# Patient Record
Sex: MALE | Race: BLACK OR AFRICAN AMERICAN | Employment: FULL TIME | URBAN - METROPOLITAN AREA
[De-identification: names, ages, dates, MRNs, and addresses within clinical notes are randomized per-mention and may not be internally consistent; named-entity substitution may affect disease eponyms.]

---

## 2024-11-20 ENCOUNTER — LAB ENCOUNTER (OUTPATIENT)
Dept: LAB | Age: 54
End: 2024-11-20
Attending: FAMILY MEDICINE
Payer: COMMERCIAL

## 2024-11-20 ENCOUNTER — OFFICE VISIT (OUTPATIENT)
Dept: FAMILY MEDICINE CLINIC | Facility: CLINIC | Age: 54
End: 2024-11-20
Payer: COMMERCIAL

## 2024-11-20 VITALS
RESPIRATION RATE: 20 BRPM | HEIGHT: 67 IN | BODY MASS INDEX: 42.53 KG/M2 | DIASTOLIC BLOOD PRESSURE: 90 MMHG | HEART RATE: 90 BPM | WEIGHT: 271 LBS | OXYGEN SATURATION: 95 % | SYSTOLIC BLOOD PRESSURE: 152 MMHG

## 2024-11-20 DIAGNOSIS — M25.562 CHRONIC PAIN OF BOTH KNEES: ICD-10-CM

## 2024-11-20 DIAGNOSIS — Z12.5 SCREENING FOR PROSTATE CANCER: ICD-10-CM

## 2024-11-20 DIAGNOSIS — Z83.3 FAMILY HISTORY OF DIABETES MELLITUS: ICD-10-CM

## 2024-11-20 DIAGNOSIS — D64.9 ANEMIA, UNSPECIFIED TYPE: ICD-10-CM

## 2024-11-20 DIAGNOSIS — I10 ESSENTIAL HYPERTENSION: ICD-10-CM

## 2024-11-20 DIAGNOSIS — M25.561 CHRONIC PAIN OF BOTH KNEES: ICD-10-CM

## 2024-11-20 DIAGNOSIS — G89.29 CHRONIC PAIN OF BOTH KNEES: ICD-10-CM

## 2024-11-20 DIAGNOSIS — Z00.00 LABORATORY EXAM ORDERED AS PART OF ROUTINE GENERAL MEDICAL EXAMINATION: ICD-10-CM

## 2024-11-20 DIAGNOSIS — Z12.11 COLON CANCER SCREENING: ICD-10-CM

## 2024-11-20 LAB
ALBUMIN SERPL-MCNC: 4.3 G/DL (ref 3.2–4.8)
ALBUMIN/GLOB SERPL: 1.3 {RATIO} (ref 1–2)
ALP LIVER SERPL-CCNC: 75 U/L
ALT SERPL-CCNC: 14 U/L
ANION GAP SERPL CALC-SCNC: 6 MMOL/L (ref 0–18)
AST SERPL-CCNC: 21 U/L (ref ?–34)
BASOPHILS # BLD AUTO: 0.04 X10(3) UL (ref 0–0.2)
BASOPHILS NFR BLD AUTO: 0.8 %
BILIRUB SERPL-MCNC: 0.5 MG/DL (ref 0.3–1.2)
BUN BLD-MCNC: 13 MG/DL (ref 9–23)
CALCIUM BLD-MCNC: 9.3 MG/DL (ref 8.7–10.4)
CHLORIDE SERPL-SCNC: 110 MMOL/L (ref 98–112)
CHOLEST SERPL-MCNC: 137 MG/DL (ref ?–200)
CO2 SERPL-SCNC: 27 MMOL/L (ref 21–32)
COMPLEXED PSA SERPL-MCNC: 1.3 NG/ML (ref ?–4)
CREAT BLD-MCNC: 1.19 MG/DL
EGFRCR SERPLBLD CKD-EPI 2021: 72 ML/MIN/1.73M2 (ref 60–?)
EOSINOPHIL # BLD AUTO: 0.14 X10(3) UL (ref 0–0.7)
EOSINOPHIL NFR BLD AUTO: 2.8 %
ERYTHROCYTE [DISTWIDTH] IN BLOOD BY AUTOMATED COUNT: 22.7 %
FASTING PATIENT LIPID ANSWER: YES
FASTING STATUS PATIENT QL REPORTED: YES
GLOBULIN PLAS-MCNC: 3.4 G/DL (ref 2–3.5)
GLUCOSE BLD-MCNC: 78 MG/DL (ref 70–99)
HCT VFR BLD AUTO: 26.3 %
HDLC SERPL-MCNC: 38 MG/DL (ref 40–59)
HGB BLD-MCNC: 6.7 G/DL
IMM GRANULOCYTES # BLD AUTO: 0.01 X10(3) UL (ref 0–1)
IMM GRANULOCYTES NFR BLD: 0.2 %
LDLC SERPL CALC-MCNC: 87 MG/DL (ref ?–100)
LYMPHOCYTES # BLD AUTO: 1.6 X10(3) UL (ref 1–4)
LYMPHOCYTES NFR BLD AUTO: 31.6 %
MCH RBC QN AUTO: 14.6 PG (ref 26–34)
MCHC RBC AUTO-ENTMCNC: 25.5 G/DL (ref 31–37)
MCV RBC AUTO: 57.3 FL
MONOCYTES # BLD AUTO: 0.44 X10(3) UL (ref 0.1–1)
MONOCYTES NFR BLD AUTO: 8.7 %
NEUTROPHILS # BLD AUTO: 2.84 X10 (3) UL (ref 1.5–7.7)
NEUTROPHILS # BLD AUTO: 2.84 X10(3) UL (ref 1.5–7.7)
NEUTROPHILS NFR BLD AUTO: 55.9 %
NONHDLC SERPL-MCNC: 99 MG/DL (ref ?–130)
OSMOLALITY SERPL CALC.SUM OF ELEC: 295 MOSM/KG (ref 275–295)
PLATELET # BLD AUTO: 543 10(3)UL (ref 150–450)
PLATELET MORPHOLOGY: NORMAL
POTASSIUM SERPL-SCNC: 4.2 MMOL/L (ref 3.5–5.1)
PROT SERPL-MCNC: 7.7 G/DL (ref 5.7–8.2)
RBC # BLD AUTO: 4.59 X10(6)UL
SODIUM SERPL-SCNC: 143 MMOL/L (ref 136–145)
TRIGL SERPL-MCNC: 53 MG/DL (ref 30–149)
TSI SER-ACNC: 2.73 UIU/ML (ref 0.55–4.78)
VLDLC SERPL CALC-MCNC: 8 MG/DL (ref 0–30)
WBC # BLD AUTO: 5.1 X10(3) UL (ref 4–11)

## 2024-11-20 PROCEDURE — 83540 ASSAY OF IRON: CPT | Performed by: FAMILY MEDICINE

## 2024-11-20 PROCEDURE — 3008F BODY MASS INDEX DOCD: CPT | Performed by: FAMILY MEDICINE

## 2024-11-20 PROCEDURE — 80061 LIPID PANEL: CPT | Performed by: FAMILY MEDICINE

## 2024-11-20 PROCEDURE — 82728 ASSAY OF FERRITIN: CPT | Performed by: FAMILY MEDICINE

## 2024-11-20 PROCEDURE — 80050 GENERAL HEALTH PANEL: CPT | Performed by: FAMILY MEDICINE

## 2024-11-20 PROCEDURE — 3077F SYST BP >= 140 MM HG: CPT | Performed by: FAMILY MEDICINE

## 2024-11-20 PROCEDURE — 83550 IRON BINDING TEST: CPT | Performed by: FAMILY MEDICINE

## 2024-11-20 PROCEDURE — 83036 HEMOGLOBIN GLYCOSYLATED A1C: CPT | Performed by: FAMILY MEDICINE

## 2024-11-20 PROCEDURE — 99204 OFFICE O/P NEW MOD 45 MIN: CPT | Performed by: FAMILY MEDICINE

## 2024-11-20 PROCEDURE — 84153 ASSAY OF PSA TOTAL: CPT | Performed by: FAMILY MEDICINE

## 2024-11-20 PROCEDURE — 3080F DIAST BP >= 90 MM HG: CPT | Performed by: FAMILY MEDICINE

## 2024-11-20 RX ORDER — MELOXICAM 7.5 MG/1
7.5 TABLET ORAL DAILY
Qty: 30 TABLET | Refills: 2 | Status: SHIPPED | OUTPATIENT
Start: 2024-11-20

## 2024-11-20 RX ORDER — MELOXICAM 7.5 MG/1
7.5 TABLET ORAL DAILY
COMMUNITY
End: 2024-11-20

## 2024-11-20 NOTE — PROGRESS NOTES
[facility] Office Note  Chief Complaint:   Chief Complaint   Patient presents with    [facility]           Arthritis     Pt states he did highschool/ college wrestling. Bilateral knees and ankles the most     Blood Pressure     Pt states his BP runs high normal        HPI:   This is a 54 year old male coming in to establish care. He reports h/o chronic bilateral knee pain. Ongoing for years. He did play high school/college wrestling.  He uses meloxicam as needed, this was prescribed at urgent care in the past.  He has not had any imaging done on his knee.  He has not had his knees evaluated.  He has used over-the-counter supplement such as turmeric, glucosamine with some improvement in symptoms.  He also reports issues with elevated blood pressure readings.  He has been told that previous appointments that his blood pressure was elevated.  He does monitor his blood pressure at home and has had elevated values with systolics in the 150s.  He wants to pursue lifestyle changes starting any pharmacological management.  He does have a family history of hypertension (mother, father).  He has family history of diabetes (mother).  He denies any other acute concerns today.  Denies any headaches, vision changes, chest pains, dyspnea, palpitations, lower extremity swelling.  Denies any nausea, vomiting, constipation, diarrhea, abdominal pain.    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  He has a life partner.  He denies any tobacco or drug use.  He does report social alcohol use.    History reviewed. No pertinent past medical history.  History reviewed. No pertinent surgical history.  Social History:  Social History     Tobacco Use    Smoking status: Never    Smokeless tobacco: Never   Vaping Use    Vaping status: Never Used   Substance and Sexual Activity    Alcohol use: Yes     Comment: ocassionally    Drug use: Never     Family History:  Family History   Problem Relation Age of  Onset    Pulmonary Disease Father     Cancer Father     Hypertension Mother     Heart Disorder Mother     Cancer Sister      Allergies:  Allergies[1]  Current Meds:  Current Outpatient Medications   Medication Sig Dispense Refill    Meloxicam 7.5 MG Oral Tab Take 1 tablet (7.5 mg total) by mouth daily.        Counseling given: Not Answered       REVIEW OF SYSTEMS:   Review of Systems   A comprehensive 10 point review of systems was completed.  Pertinent positives and negatives noted in the the HPI.    EXAM:   /90   Pulse 90   Resp 20   Ht 5' 7\" (1.702 m)   Wt 271 lb (122.9 kg)   SpO2 95%   BMI 42.44 kg/m²  Estimated body mass index is 42.44 kg/m² as calculated from the following:    Height as of this encounter: 5' 7\" (1.702 m).    Weight as of this encounter: 271 lb (122.9 kg).   Vital signs reviewed.Appears stated age, well groomed.  Physical Exam  Vitals and nursing note reviewed.   Constitutional:       Appearance: Normal appearance.   HENT:      Head: Normocephalic and atraumatic.   Eyes:      Pupils: Pupils are equal, round, and reactive to light.   Cardiovascular:      Rate and Rhythm: Normal rate and regular rhythm.      Pulses: Normal pulses.      Heart sounds: Normal heart sounds. No murmur heard.  Pulmonary:      Effort: Pulmonary effort is normal. No respiratory distress.      Breath sounds: Normal breath sounds. No wheezing or rhonchi.   Abdominal:      General: Abdomen is flat. Bowel sounds are normal. There is no distension.      Palpations: Abdomen is soft. There is no mass.      Tenderness: There is no abdominal tenderness.      Hernia: No hernia is present.   Musculoskeletal:      Right knee: No swelling, deformity or crepitus. Normal range of motion. No tenderness. No LCL laxity, MCL laxity, ACL laxity or PCL laxity. Normal alignment, normal meniscus and normal patellar mobility.      Left knee: No swelling, deformity or crepitus. Normal range of motion. No tenderness. No LCL laxity,  MCL laxity, ACL laxity or PCL laxity.Normal alignment, normal meniscus and normal patellar mobility.      Right lower leg: No edema.      Left lower leg: No edema.   Skin:     Findings: No rash.   Neurological:      General: No focal deficit present.      Mental Status: He is alert and oriented to person, place, and time.   Psychiatric:         Mood and Affect: Mood normal.          ASSESSMENT AND PLAN:   1. Essential hypertension  Meets criteria for hypertension.  He wants to pursue lifestyle changes first, I am in agreement.  Counseled on low-sodium diet.  Advised to look up Dash or Mediterranean diets.  Advised on regular physical exercise, aim for 150 minutes of moderate intensity exercise weekly.  Continue to monitor blood pressure at home, goal blood pressure less than 140/90.  He will follow-up in 3 months for BP recheck.  Due for labs.      2. Laboratory exam ordered as part of routine general medical examination  - CBC With Differential With Platelet; Future  - Comp Metabolic Panel (14); Future  - Lipid Panel; Future  - TSH W Reflex To Free T4; Future    3. Screening for prostate cancer  - PSA Total, Screen; Future    4. Chronic pain of both knees  Suspect osteoarthritis.  Will obtain imaging.  Continue supportive care management.  Okay to continue meloxicam; reviewed medication administration and side effects.  Counseled on RICE therapy.  Consider physical therapy if needed.  Work on weight loss as well.  Follow-up 3 months or sooner as needed.  - Meloxicam 7.5 MG Oral Tab; Take 1 tablet (7.5 mg total) by mouth daily.  Dispense: 30 tablet; Refill: 2  - XR KNEE (3 VIEWS), LEFT (CPT=73562); Future  - XR KNEE (3 VIEWS), RIGHT (CPT=73562); Future    5. Colon cancer screening  - COLOGUARD COLON CANCER SCREENING (EXTERNAL)    6. Family history of diabetes mellitus  - Hemoglobin A1C [E]; Future      Meds & Refills for this Visit:  Requested Prescriptions     Pending Prescriptions Disp Refills    Meloxicam 7.5 MG  Oral Tab 30 tablet 0     Sig: Take 1 tablet (7.5 mg total) by mouth daily.       Health Maintenance:  Health Maintenance Due   Topic Date Due    Annual Physical  Never done    Colorectal Cancer Screening  Never done    DTaP,Tdap,and Td Vaccines (1 - Tdap) Never done    PSA  Never done    Zoster Vaccines (1 of 2) Never done    Annual Depression Screening  Never done    COVID-19 Vaccine (1 - 2024-25 season) Never done    Influenza Vaccine (1) Never done       Patient/Caregiver Education: Patient/Caregiver Education: There are no barriers to learning. Medical education done.   Outcome: Patient verbalizes understanding. Patient is notified to call with any questions, complications, allergies, or worsening or changing symptoms.  Patient is to call with any side effects or complications from the treatments as a result of today.     Problem List:  There is no problem list on file for this patient.         [1] No Known Allergies

## 2024-11-20 NOTE — PATIENT INSTRUCTIONS
Controlling High Blood Pressure   High blood pressure (hypertension) is often called the silent killer. This is because many people who have it, don’t know it. It can be very dangerous. High blood pressure can raise your risk of heart attack, stroke, heart disease, and heart failure. Controlling your blood pressure can lower your risk of these problems. It's important to check yourblood pressure regularly. It can save your life.   Blood pressure measurements are given as 2 numbers. Systolic blood pressure is the upper number. This is the pressure when the heart contracts. Diastolic blood pressure is the lower number. This is the pressure when the heartrelaxes between beats.   Blood pressure is grouped like this:   Normal blood pressure. This is systolic of less than 120 and diastolic of less than 80 (120/80).  Elevated blood pressure.  This is systolic of 120 to 129 and diastolic less than 80.  Stage 1 high blood pressure.  This is systolic of 130 to 139 or diastolic between 80 to 89.  Stage 2 high blood pressure.  This is systolic of 140 or higher or diastolic of 90 or higher.  A heart-healthy lifestyle can help you control your blood pressure withoutmedicines. Below are some things you can do to have a heart-healthy lifestyle.     Eat heart-healthy foods   Choose low-salt, low-fat foods. Limit your sodium to 2,300 mg per day or the amount advised by your healthcare provider.  Limit canned, dried, cured, packaged, and fast foods. These can contain a lot of salt.  Eat 8 to 10 servings of fruits and vegetables every day.  Choose lean meats, fish, or chicken.  Eat whole-grain pasta, brown rice, and beans.  Eat 2 to 3 servings of low-fat or fat-free dairy products.  Ask your doctor about the DASH eating plan. This plan helps reduce blood pressure.  When you go to a restaurant, ask that your meal be made with no added salt.    Stay at a healthy weight   Ask your healthcare provider how many calories to eat a day. Then  stick to that number.  Ask your provider what weight range is healthiest for you. If you are overweight, a weight loss of only 3% to 5% of your body weight can help lower blood pressure. A good weight loss goal is to lose 10% of your body weight in a year.  Limit snacks and sweets.  Get regular exercise.    Get more active   Find activities you enjoy. They can be done alone or with friends or family. Try bicycling, dancing, walking, or jogging.  Park farther away from building entrances to walk more.  Use stairs instead of the elevator.  When you can, walk or bike instead of driving.  Jermyn leaves, garden, or do household repairs.  Be active at a moderate to vigorous level of physical activity for at least 30 minutes a day for at least 5 days a week.     Manage stress   Make time to relax and enjoy life. Find time to laugh.  Talk about your concerns with your loved ones and your healthcare provider.  Visit with family and friends, and keep up with hobbies.    Limit alcohol and quit smoking   Men should have no more than 2 drinks per day.  Women should have no more than 1 drink per day.  If you smoke, make a plan to stop. Talk with your healthcare provider for help. Smoking greatly raises your risk for heart disease and stroke. Ask your provider about stop-smoking programs and other support.    Blood pressure medicines  If your lifestyle changes aren’t enough, your healthcare provider may prescribe high blood pressure medicine. Take all medicines as prescribed. If you have any questions about yourmedicines, ask your provider before stopping or changing them.   [employer] last reviewed this educational content on12/1/2021 © 2000-2022 [employer]. All rights reserved. This information is not intended as a substitute for professional medical care. Always follow yourSum[facility] Health Barber[facility] Campuscare professional's instruction    Video HealthSheets™  What is High Blood Pressure?  Understand what blood pressure is, the health risks  of having high blood pressure, the factors that put you at risk for having high blood pressure, and the importance of working with your healthcare provider to control it.  To watch the video:  Scan the QR code  Using your mobile device, scan the following code:  OR  Go to the website:  LocaMap  Enter the prescription code:  3414E    © 2000-2022 [employer]. All rights reserved. This information is not intended as a substitute for professional medical care. Always follow your healthcare professional's instructions.    Video C[employer] Podium  Eating Well with High Blood Pressure  Certain foods can make your blood pressure go too high. Watch and learn how easy it is to have delicious meals without harming your health.     To watch the video:  Scan the QR code  Using your mobile device, scan the following code:  OR  Go to the website:  www.kramesvideo.com  Enter the prescription code:   QIX       © 2000-2022 [employer]. All rights reserved. This information is not intended as a substitute for professional medical care. Always follow your healthcare professional's instructions.    Taking Your Blood Pressure  Blood pressure is the force of blood against the artery wall as it moves from the heart through the blood vessels. You can take your own blood pressure reading using a digital monitor. Take your readings the same each time, usingthe same arm. Take readings as often as your healthcare provider advises.   About blood pressure monitors  Blood pressure monitors are designed for certain ages and cases. You can find monitors for older adults, for pregnant women, and for children. Make sure theone you choose is the right one for your age and situation.   Experts advise an automatic cuff monitor that fits on your upper arm (bicep). The cuff should fit your arm size. A cuff that’s too large or too small won't give an accurate reading. Measure around yourupper arm to find your  size.   Monitors that attach to your finger or wrist are not as accurate as monitorsfor your upper arm.   Ask your healthcare provider for help in choosing a monitor. Bring your monitorto your next provider visit if you need help in using it the correct way.   The steps below are general instructions for using an automatic digitalmonitor.   Step 1. Relax    Take your blood pressure at the same time every day, such as in the morning or evening. Or take it at the time your healthcare provider advises.  Wait at least 30 minutes after smoking, eating, or exercising. Don't drink coffee, tea, soda, or other caffeinated drinks before checking your blood pressure. Use the restroom beforehand.  Sit comfortably at a table with both feet on the floor. Don't cross your legs or feet. Place the monitor near you.  Rest for at least 5 minutes before you begin. Make sure there are no distractions. This includes TV, cell phones, and other electronics. Wait to have conversations with others until after you measure you blood pressure.  Step 2. Wrap the cuff    Place your arm on the table, palm up. Your arm should be at the level of your heart. Wrap the cuff around your upper arm, just above your elbow. It’s best done on bare skin, not over clothing. Most cuffs will show you where the blood vessel in the middle of the arm at the inner side of the elbow (the brachial artery) should line up with the cuff. Look in your monitor's instruction booklet for an illustration. You can also bring your cuff to your healthcare provider and have them show you how to correctly place the cuff.  Step 3. Inflate the cuff    Push the button that starts the pump.  The cuff will tighten, then loosen.  The numbers will change. When they stop changing, your blood pressure reading will appear.  Take 2 or 3 readings 1 minute apart, or as advised by your provider.  Step 4. Write down the results of each reading    Write down your blood pressure numbers for each  reading. Note the date and time. Keep your results in 1 place, such as a notebook. Even if your monitor has a built-in memory, keep a hard copy of the readings.  Remove the cuff from your arm. Turn off the machine.  Bring your blood pressure records with you to each provider visit.  If you start a new blood pressure medicine, note the day you started the new medicine. Also note the day if you change the dose of your medicine. Measure your blood pressure before your take your medicine. This information goes on your blood pressure recording sheet. This will help your provider check how well the medicine changes are working.  Ask your provider what numbers mean that you should call them. Also ask what numbers mean that you should get help right away.  Jamal last reviewed this educational content on12/1/2021 © 2000-2022 [employer]. All rights reserved. This information is not intended as a substitute for professional medical care. Always follow yourhealthcare professional's instructions.

## 2024-11-21 ENCOUNTER — LAB ENCOUNTER (OUTPATIENT)
Dept: LAB | Age: 54
End: 2024-11-21
Attending: FAMILY MEDICINE
Payer: COMMERCIAL

## 2024-11-21 DIAGNOSIS — D64.9 ANEMIA, UNSPECIFIED TYPE: ICD-10-CM

## 2024-11-21 DIAGNOSIS — D64.9 ANEMIA, UNSPECIFIED TYPE: Primary | ICD-10-CM

## 2024-11-21 LAB
BASOPHILS # BLD AUTO: 0.03 X10(3) UL (ref 0–0.2)
BASOPHILS NFR BLD AUTO: 0.5 %
DEPRECATED HBV CORE AB SER IA-ACNC: 3 NG/ML
EOSINOPHIL # BLD AUTO: 0.15 X10(3) UL (ref 0–0.7)
EOSINOPHIL NFR BLD AUTO: 2.7 %
ERYTHROCYTE [DISTWIDTH] IN BLOOD BY AUTOMATED COUNT: 22.7 %
HCT VFR BLD AUTO: 26.9 %
HGB BLD-MCNC: 7 G/DL
HGBA1C: 5.9 %
IMM GRANULOCYTES # BLD AUTO: 0.01 X10(3) UL (ref 0–1)
IMM GRANULOCYTES NFR BLD: 0.2 %
IRON SATN MFR SERPL: 3 %
IRON SERPL-MCNC: 13 UG/DL
LYMPHOCYTES # BLD AUTO: 1.88 X10(3) UL (ref 1–4)
LYMPHOCYTES NFR BLD AUTO: 33.6 %
MCH RBC QN AUTO: 14.8 PG (ref 26–34)
MCHC RBC AUTO-ENTMCNC: 26 G/DL (ref 31–37)
MCV RBC AUTO: 56.8 FL
MONOCYTES # BLD AUTO: 0.62 X10(3) UL (ref 0.1–1)
MONOCYTES NFR BLD AUTO: 11.1 %
NEUTROPHILS # BLD AUTO: 2.9 X10 (3) UL (ref 1.5–7.7)
NEUTROPHILS # BLD AUTO: 2.9 X10(3) UL (ref 1.5–7.7)
NEUTROPHILS NFR BLD AUTO: 51.9 %
PLATELET # BLD AUTO: 537 10(3)UL (ref 150–450)
PLATELET MORPHOLOGY: NORMAL
RBC # BLD AUTO: 4.74 X10(6)UL
TOTAL IRON BINDING CAPACITY: 457 UG/DL (ref 250–425)
TRANSFERRIN SERPL-MCNC: 368 MG/DL (ref 215–365)
WBC # BLD AUTO: 5.6 X10(3) UL (ref 4–11)

## 2024-11-21 PROCEDURE — 85025 COMPLETE CBC W/AUTO DIFF WBC: CPT | Performed by: FAMILY MEDICINE

## 2024-11-22 ENCOUNTER — TELEPHONE (OUTPATIENT)
Dept: RHEUMATOLOGY | Facility: CLINIC | Age: 54
End: 2024-11-22

## 2024-11-22 PROBLEM — D50.9 IRON DEFICIENCY ANEMIA: Status: ACTIVE | Noted: 2024-11-22

## 2024-11-22 NOTE — TELEPHONE ENCOUNTER
Eri with Dr. Victor's office calling stating they need to get this patient in for an urgent colonoscopy.    Call back number is [phone]

## 2024-11-25 ENCOUNTER — OFFICE VISIT (OUTPATIENT)
Facility: LOCATION | Age: 54
End: 2024-11-25
Payer: COMMERCIAL

## 2024-11-25 ENCOUNTER — TELEPHONE (OUTPATIENT)
Facility: LOCATION | Age: 54
End: 2024-11-25

## 2024-11-25 VITALS
SYSTOLIC BLOOD PRESSURE: 174 MMHG | HEART RATE: 78 BPM | TEMPERATURE: 98 F | WEIGHT: 271 LBS | DIASTOLIC BLOOD PRESSURE: 100 MMHG | BODY MASS INDEX: 42.53 KG/M2 | HEIGHT: 67 IN | OXYGEN SATURATION: 100 %

## 2024-11-25 DIAGNOSIS — D64.9 ANEMIA, UNSPECIFIED TYPE: Primary | ICD-10-CM

## 2024-11-25 DIAGNOSIS — D50.9 IRON DEFICIENCY ANEMIA, UNSPECIFIED IRON DEFICIENCY ANEMIA TYPE: ICD-10-CM

## 2024-11-25 PROCEDURE — 99203 OFFICE O/P NEW LOW 30 MIN: CPT | Performed by: STUDENT IN AN ORGANIZED HEALTH CARE EDUCATION/TRAINING PROGRAM

## 2024-11-25 PROCEDURE — 3077F SYST BP >= 140 MM HG: CPT | Performed by: STUDENT IN AN ORGANIZED HEALTH CARE EDUCATION/TRAINING PROGRAM

## 2024-11-25 PROCEDURE — 3080F DIAST BP >= 90 MM HG: CPT | Performed by: STUDENT IN AN ORGANIZED HEALTH CARE EDUCATION/TRAINING PROGRAM

## 2024-11-25 PROCEDURE — 3008F BODY MASS INDEX DOCD: CPT | Performed by: STUDENT IN AN ORGANIZED HEALTH CARE EDUCATION/TRAINING PROGRAM

## 2024-11-25 RX ORDER — IBUPROFEN 200 MG
200 TABLET ORAL EVERY 6 HOURS PRN
COMMUNITY
End: 2024-11-26

## 2024-11-25 RX ORDER — POLYETHYLENE GLYCOL 3350, SODIUM CHLORIDE, SODIUM BICARBONATE, POTASSIUM CHLORIDE 420; 11.2; 5.72; 1.48 G/4L; G/4L; G/4L; G/4L
POWDER, FOR SOLUTION ORAL
Qty: 1 EACH | Refills: 0 | Status: SHIPPED | OUTPATIENT
Start: 2024-11-25

## 2024-11-25 NOTE — TELEPHONE ENCOUNTER
JANUARY BROWNLEE Patient  Member ID  [id number]    Date of Birth  1970-08-20    Gender  Male    Transaction Type  Outpatient Authorization    Organization  [facility]    Payer  [facility] logo     Certificate Information  Reference Number  [id number]    Status  NO ACTION REQUIRED    Message  Requested Service does not require preauthorization. We would strongly encourage you to check benefits for this service.    Member Information  Patient Name  JANUARY BROWNLEE    Patient Date of Birth  1970-08-20    Patient Gender  Male    Member ID  [id number]    Relationship to Subscriber  Self    Subscriber Name  JANUARY BROWNLEE    Requesting Provider     Name  GIANNA DARYN    NPI  [id number]    Tax Id  [id number]    Specialty  [id number]    Provider Role  Provider    Address  [address] [place] [address]    Phone  [phone]    Fax  [phone]    Contact Name  MOHAMUD FITZPATRICK    Service Information  Service Type  2 - Surgical    Place of Service  22 - [facility]    Service From - To Date  2024-12-03 - 2024-12-31    Level of Service  Elective    Diagnosis Code 1  D649 - Anemia unspecified    Procedure Code 1 (CPT/HCPCS)  95639 - DIAGNOSTIC COLONOSCOPY    Quantity  1 Units    Status  NO ACTION REQUIRED    Procedure Code 2 (CPT/HCPCS)  20861 - EGD BIOPSY SINGLE/MULTIPLE    Quantity  1 Units    Status  NO ACTION REQUIRED

## 2024-11-26 ENCOUNTER — TELEPHONE (OUTPATIENT)
Dept: FAMILY MEDICINE CLINIC | Facility: CLINIC | Age: 54
End: 2024-11-26

## 2024-11-26 NOTE — TELEPHONE ENCOUNTER
Orders received from Elier Romo for surgical clearance - EGD & colonoscopy on 12/03/2024.Routed pre op sheet and orders. pre op apt on 12/02

## 2024-11-26 NOTE — TELEPHONE ENCOUNTER
Orders received from Elier Romo for surgical clearance - EGD & colonoscopy on 12/03/2024. Orders stored in fax room. Pt will call back to scheduled an appt.

## 2024-11-27 NOTE — TELEPHONE ENCOUNTER
Paperwork reviewed with PCP and informed patient does not need clearance for colonoscopy.and sleep study is not required prior to colonoscopy per Dr Victor.

## 2024-11-29 NOTE — H&P (VIEW-ONLY)
New Patient Visit Note       Active Problems      1. Anemia, unspecified type    2. Iron deficiency anemia, unspecified iron deficiency anemia type        Chief Complaint   Chief Complaint   Patient presents with    New Patient     NP- colonoscopy consult- no family history of colon cancer, Feeling tired, no other symptoms,HGB 7, 12/2/2024 Iron infusion.          History of Present Illness   This is a very nice 54-year-old gentleman referred to me for consideration of a colonoscopy in the setting of severe iron iron deficiency anemia.  Patient recently established care with Dr. Victor, his primary care physician, and routine blood work was collected on 11/21/2024.  This showed severe anemia with hemoglobin 7.0.  Patient endorses feeling fatigued but states he works 13-hour shifts and attributed his fatigue due to this.  He denies any rectal bleeding or melena.  He denies any change in bowel habits or unintentional weight loss.  He denies any abdominal pain.  He has never had an EGD or colonoscopy.  No family history of colon or rectal cancer.  No blood thinners.      Allergies  De has No Known Allergies.    Past Medical / Surgical / Social / Family History    The past medical and past surgical history have been reviewed by me today.    History reviewed. No pertinent past medical history.  History reviewed. No pertinent surgical history.    The family history and social history have been reviewed by me today.    Family History   Problem Relation Age of Onset    Pulmonary Disease Father     Cancer Father     Hypertension Mother     Heart Disorder Mother     Cancer Sister     Breast Cancer Sister         Breast cancer     Social History     Socioeconomic History    Marital status: Single   Tobacco Use    Smoking status: Never    Smokeless tobacco: Never   Vaping Use    Vaping status: Never Used   Substance and Sexual Activity    Alcohol use: Not Currently     Alcohol/week: 0.0 - 1.0 standard drinks of alcohol      Comment: ocassionally    Drug use: Never   Other Topics Concern    Caffeine Concern No    Exercise No    Seat Belt No    Special Diet Yes     Comment: No red meat    Stress Concern No    Weight Concern No        Current Outpatient Medications:     PEG 3350-KCl-Na Bicarb-NaCl (TRILYTE) 420 g Oral Recon Soln, Starting at 4:00 pm the night before procedure, drink 8 ounces of the prep every 15-20 minutes until finished, Disp: 1 each, Rfl: 0    Meloxicam 7.5 MG Oral Tab, Take 1 tablet (7.5 mg total) by mouth daily. (Patient taking differently: Take 1 tablet (7.5 mg total) by mouth as needed.), Disp: 30 tablet, Rfl: 2      Review of Systems  A 10 point review of systems was performed and negative unless otherwise documented per HPI.    Physical Findings   BP (!) 174/100 (BP Location: Right arm, Patient Position: Sitting, Cuff Size: adult)   Pulse 78   Temp 98.2 °F (36.8 °C) (Temporal)   Ht 67\"   Wt 271 lb (122.9 kg)   SpO2 100%   BMI 42.44 kg/m²   Physical Exam  Vitals and nursing note reviewed. Exam conducted with a chaperone present.   Constitutional:       General: He is not in acute distress.  HENT:      Head: Normocephalic and atraumatic.      Mouth/Throat:      Mouth: Mucous membranes are moist.   Cardiovascular:      Rate and Rhythm: Normal rate and regular rhythm.   Pulmonary:      Effort: Pulmonary effort is normal.   Abdominal:      General: There is no distension.      Palpations: Abdomen is soft.      Tenderness: There is no abdominal tenderness.   Musculoskeletal:         General: No deformity.   Skin:     General: Skin is warm and dry.   Neurological:      General: No focal deficit present.      Mental Status: He is alert.   Psychiatric:         Mood and Affect: Mood normal.             Assessment   1. Anemia, unspecified type    2. Iron deficiency anemia, unspecified iron deficiency anemia type        This is a very nice 54-year-old gentleman referred to me for consideration of a colonoscopy in the  setting of severe iron iron deficiency anemia.  Patient recently established care with Dr. Victor, his primary care physician, and routine blood work was collected on 11/21/2024.  This showed severe anemia with hemoglobin 7.0.  Patient endorses feeling fatigued but states he works 13-hour shifts and attributed his fatigue due to this.  He denies any rectal bleeding or melena.  He denies any change in bowel habits or unintentional weight loss.  He denies any abdominal pain.  He has never had an EGD or colonoscopy.  No family history of colon or rectal cancer.  No blood thinners.    Plan  I recommend planning for an EGD and colonoscopy as soon as reasonably possible to rule out any large masses or ulcer disease that may explain his severe anemia.  The details of these procedures were discussed including the prep instructions, risks, benefits and alternatives.  Patient expressed understanding and was agreeable to schedule these procedures with me.  These have been scheduled for 12/3/2024 at [facility] under monitored anesthesia care.     No orders of the defined types were placed in this encounter.      Imaging & Referrals   None    Follow Up  No follow-ups on file.    Elier Khan MD

## 2024-11-29 NOTE — H&P
New Patient Visit Note       Active Problems      1. Anemia, unspecified type    2. Iron deficiency anemia, unspecified iron deficiency anemia type        Chief Complaint   Chief Complaint   Patient presents with    New Patient     NP- colonoscopy consult- no family history of colon cancer, Feeling tired, no other symptoms,HGB 7, 12/2/2024 Iron infusion.          History of Present Illness   This is a very nice 54-year-old gentleman referred to me for consideration of a colonoscopy in the setting of severe iron iron deficiency anemia.  Patient recently established care with Dr. Victor, his primary care physician, and routine blood work was collected on 11/21/2024.  This showed severe anemia with hemoglobin 7.0.  Patient endorses feeling fatigued but states he works 13-hour shifts and attributed his fatigue due to this.  He denies any rectal bleeding or melena.  He denies any change in bowel habits or unintentional weight loss.  He denies any abdominal pain.  He has never had an EGD or colonoscopy.  No family history of colon or rectal cancer.  No blood thinners.      Allergies  De has No Known Allergies.    Past Medical / Surgical / Social / Family History    The past medical and past surgical history have been reviewed by me today.    History reviewed. No pertinent past medical history.  History reviewed. No pertinent surgical history.    The family history and social history have been reviewed by me today.    Family History   Problem Relation Age of Onset    Pulmonary Disease Father     Cancer Father     Hypertension Mother     Heart Disorder Mother     Cancer Sister     Breast Cancer Sister         Breast cancer     Social History     Socioeconomic History    Marital status: Single   Tobacco Use    Smoking status: Never    Smokeless tobacco: Never   Vaping Use    Vaping status: Never Used   Substance and Sexual Activity    Alcohol use: Not Currently     Alcohol/week: 0.0 - 1.0 standard drinks of alcohol      Comment: ocassionally    Drug use: Never   Other Topics Concern    Caffeine Concern No    Exercise No    Seat Belt No    Special Diet Yes     Comment: No red meat    Stress Concern No    Weight Concern No        Current Outpatient Medications:     PEG 3350-KCl-Na Bicarb-NaCl (TRILYTE) 420 g Oral Recon Soln, Starting at 4:00 pm the night before procedure, drink 8 ounces of the prep every 15-20 minutes until finished, Disp: 1 each, Rfl: 0    Meloxicam 7.5 MG Oral Tab, Take 1 tablet (7.5 mg total) by mouth daily. (Patient taking differently: Take 1 tablet (7.5 mg total) by mouth as needed.), Disp: 30 tablet, Rfl: 2      Review of Systems  A 10 point review of systems was performed and negative unless otherwise documented per HPI.    Physical Findings   BP (!) 174/100 (BP Location: Right arm, Patient Position: Sitting, Cuff Size: adult)   Pulse 78   Temp 98.2 °F (36.8 °C) (Temporal)   Ht 67\"   Wt 271 lb (122.9 kg)   SpO2 100%   BMI 42.44 kg/m²   Physical Exam  Vitals and nursing note reviewed. Exam conducted with a chaperone present.   Constitutional:       General: He is not in acute distress.  HENT:      Head: Normocephalic and atraumatic.      Mouth/Throat:      Mouth: Mucous membranes are moist.   Cardiovascular:      Rate and Rhythm: Normal rate and regular rhythm.   Pulmonary:      Effort: Pulmonary effort is normal.   Abdominal:      General: There is no distension.      Palpations: Abdomen is soft.      Tenderness: There is no abdominal tenderness.   Musculoskeletal:         General: No deformity.   Skin:     General: Skin is warm and dry.   Neurological:      General: No focal deficit present.      Mental Status: He is alert.   Psychiatric:         Mood and Affect: Mood normal.             Assessment   1. Anemia, unspecified type    2. Iron deficiency anemia, unspecified iron deficiency anemia type        This is a very nice 54-year-old gentleman referred to me for consideration of a colonoscopy in the  setting of severe iron iron deficiency anemia.  Patient recently established care with Dr. Victor, his primary care physician, and routine blood work was collected on 11/21/2024.  This showed severe anemia with hemoglobin 7.0.  Patient endorses feeling fatigued but states he works 13-hour shifts and attributed his fatigue due to this.  He denies any rectal bleeding or melena.  He denies any change in bowel habits or unintentional weight loss.  He denies any abdominal pain.  He has never had an EGD or colonoscopy.  No family history of colon or rectal cancer.  No blood thinners.    Plan  I recommend planning for an EGD and colonoscopy as soon as reasonably possible to rule out any large masses or ulcer disease that may explain his severe anemia.  The details of these procedures were discussed including the prep instructions, risks, benefits and alternatives.  Patient expressed understanding and was agreeable to schedule these procedures with me.  These have been scheduled for 12/3/2024 at [facility] under monitored anesthesia care.     No orders of the defined types were placed in this encounter.      Imaging & Referrals   None    Follow Up  No follow-ups on file.    Elier Khan MD

## 2024-12-02 ENCOUNTER — OFFICE VISIT (OUTPATIENT)
Dept: HEMATOLOGY/ONCOLOGY | Facility: HOSPITAL | Age: 54
End: 2024-12-02
Attending: INTERNAL MEDICINE
Payer: COMMERCIAL

## 2024-12-02 ENCOUNTER — ANESTHESIA EVENT (OUTPATIENT)
Dept: ENDOSCOPY | Facility: HOSPITAL | Age: 54
End: 2024-12-02
Payer: COMMERCIAL

## 2024-12-02 VITALS — SYSTOLIC BLOOD PRESSURE: 159 MMHG | DIASTOLIC BLOOD PRESSURE: 87 MMHG

## 2024-12-02 VITALS
BODY MASS INDEX: 43 KG/M2 | TEMPERATURE: 98 F | RESPIRATION RATE: 16 BRPM | HEART RATE: 108 BPM | DIASTOLIC BLOOD PRESSURE: 96 MMHG | SYSTOLIC BLOOD PRESSURE: 162 MMHG | OXYGEN SATURATION: 99 % | WEIGHT: 272 LBS

## 2024-12-02 DIAGNOSIS — D50.9 IRON DEFICIENCY ANEMIA, UNSPECIFIED IRON DEFICIENCY ANEMIA TYPE: Primary | ICD-10-CM

## 2024-12-02 PROCEDURE — 99205 OFFICE O/P NEW HI 60 MIN: CPT | Performed by: INTERNAL MEDICINE

## 2024-12-02 PROCEDURE — G2211 COMPLEX E/M VISIT ADD ON: HCPCS | Performed by: INTERNAL MEDICINE

## 2024-12-02 PROCEDURE — 96365 THER/PROPH/DIAG IV INF INIT: CPT

## 2024-12-02 NOTE — PROGRESS NOTES
Education Record    Learner:  Patient    Pt here for: New infed iron infusion    Barriers / Limitations:  None    Method:  Brief focused, printed material and  reinforcement    General Topics:  Plan of care reviewed    Outcome: Pt tolerated infusion with no c/o. VSS upon discharge. Appt in 1 month for infed and then patient will need labs drawn two months after next dose is completed.

## 2024-12-02 NOTE — PROGRESS NOTES
Hematology/Oncology Initial Consultation Note    Patient Name: Mo Gloria  Medical Record Number: [id number]    YOB: 1970   Date of Consultation: 12/2/2024   PCP: Matthew Victor MD    Reason for Consultation:  Mo Gloria was seen today for the diagnosis of iron deficiency anemia     History of Present Illness:      53 y/o M no significant PMH who presents for iron deficiency anemia.    - recent labs with significant iron deficiency anemia; hgb 7.0, plt 537k, ferritin of 3, iron sat of 3%  - denies any blood in urine or stool  - scheduled with Dr Khan to do EGD and colonoscopy tomorrow; has not had colonoscopy before  - he has been a pescatarian for the last 35 years. Has not checked his labs since he was in the , he ate red meat then. Appears from diet discussion he does not have any iron intake in his diet; no red meat or dark green vegetables  - he is always tired; works at [employer]  - sister recently diagnosed with breast ca; no other FH of cancer  - does not smoke      Past Medical History:  No past medical history on file.    History reviewed. No pertinent surgical history.    Home Medications:   Meloxicam 7.5 MG Oral Tab Take 1 tablet (7.5 mg total) by mouth daily. 30 tablet 2     -------  Medications Ordered Prior to Encounter[1]    Allergies:   Allergies[2]    Psychosocial History:  Social History     Social History Narrative    Not on file     Social History     Socioeconomic History    Marital status: Single   Tobacco Use    Smoking status: Never    Smokeless tobacco: Never   Vaping Use    Vaping status: Never Used   Substance and Sexual Activity    Alcohol use: Not Currently     Alcohol/week: 0.0 - 1.0 standard drinks of alcohol     Comment: ocassionally    Drug use: Never   Other Topics Concern    Caffeine Concern No    Exercise No    Seat Belt No    Special Diet Yes     Comment: No red meat    Stress Concern No    Weight Concern No       Family Medical  History:  Family History   Problem Relation Age of Onset    Pulmonary Disease Father     Cancer Father     Hypertension Mother     Heart Disorder Mother     Cancer Sister     Breast Cancer Sister         Breast cancer       Review of Systems:  A 10-point ROS was done with pertinent positives and negative per the HPI    Vital Signs:  Height: --  Weight: 123.4 kg (272 lb) (12/02 1412)  BSA (Calculated - sq m): --  Pulse: 108 (12/02 1412)  BP: 162/96 (12/02 1412)  Temp: 97.6 °F (36.4 °C) (12/02 1412)  Do Not Use - Resp Rate: --  SpO2: 99 % (12/02 1412)    Wt Readings from Last 6 Encounters:   12/02/24 123.4 kg (272 lb)   11/26/24 122.9 kg (271 lb)   11/25/24 122.9 kg (271 lb)   11/20/24 122.9 kg (271 lb)         Physical Examination:  General: Patient is alert and oriented, not in acute distress  Psych: Mood and affect are appropriate  Eyes: EOMI, PERRL  ENT: Oropharynx is clear, no adenopathy  CV: nl S1/S2 no LE edema  Respiratory: CTAB; non-labored respirations  GI/Abd: Soft, non-tender   Neurological: Grossly intact   Lymphatics: No palpable cervical, supraclavicular, axillary, or inguinal lymphadenopathy  Skin: no rashes or petechiae    Laboratory:  Lab Results   Component Value Date    WBC 5.6 11/21/2024    WBC 5.1 11/20/2024    HGB 7.0 (L) 11/21/2024    HGB 6.7 (LL) 11/20/2024    HCT 26.9 (L) 11/21/2024    MCV 56.8 (L) 11/21/2024    MCH 14.8 (L) 11/21/2024    MCHC 26.0 (L) 11/21/2024    RDW 22.7 11/21/2024    .0 (H) 11/21/2024    .0 (H) 11/20/2024     Lab Results   Component Value Date    GLU 78 11/20/2024    BUN 13 11/20/2024    CREATSERUM 1.19 11/20/2024    ANIONGAP 6 11/20/2024    CA 9.3 11/20/2024    OSMOCALC 295 11/20/2024    ALKPHO 75 11/20/2024    AST 21 11/20/2024    ALT 14 11/20/2024    BILT 0.5 11/20/2024    TP 7.7 11/20/2024    ALB 4.3 11/20/2024    GLOBULIN 3.4 11/20/2024     11/20/2024    K 4.2 11/20/2024     11/20/2024    CO2 27.0 11/20/2024     No results found for: \"PTT\",  \"PT\", \"INR\"      Impression & Plan:     Iron deficiency anemia  - iron studies consistent with iron deficiency. This may be due to lack of iron in diet. Agree with EGD/colonoscopy to rule out GI losses  - given significant iron deficit, schedule 1000mg IV iron dextran, then again in 1 month, close monitoring in infusion for reaction  - repeat CBC, iron, tibc, ferritin in 3 months to reassess iron stores    Thrombocytosis  - likely reactive secondary to iron deficiency anemia    Follow up in future for IV iron if iron deficient    Logan House MD  Hematology/Medical Oncology  [facility]          [1]   Current Outpatient Medications on File Prior to Visit   Medication Sig Dispense Refill    Meloxicam 7.5 MG Oral Tab Take 1 tablet (7.5 mg total) by mouth daily. 30 tablet 2    PEG 3350-KCl-Na Bicarb-NaCl (TRILYTE) 420 g Oral Recon Soln Starting at 4:00 pm the night before procedure, drink 8 ounces of the prep every 15-20 minutes until finished 1 each 0     No current facility-administered medications on file prior to visit.   [2] No Known Allergies

## 2024-12-02 NOTE — PROGRESS NOTES
Education Record    Learner:  Patient    Disease / Diagnosis: Iron Deficiency Anemia    Barriers / Limitations:  None   Comments:    Method:  Discussion   Comments:    General Topics:  Plan of care reviewed   Comments:    Outcome:  Shows understanding   Comments:    Here for new consult. He reports feeling tired, but nothing new. He does chew a lot of ice. He does not report any bleeding. He is getting an egd/colonoscopy tomorrow with Dr. Khan.

## 2024-12-03 ENCOUNTER — HOSPITAL ENCOUNTER (OUTPATIENT)
Facility: HOSPITAL | Age: 54
Setting detail: HOSPITAL OUTPATIENT SURGERY
Discharge: HOME | End: 2024-12-03
Attending: STUDENT IN AN ORGANIZED HEALTH CARE EDUCATION/TRAINING PROGRAM | Admitting: STUDENT IN AN ORGANIZED HEALTH CARE EDUCATION/TRAINING PROGRAM
Payer: COMMERCIAL

## 2024-12-03 ENCOUNTER — ANESTHESIA (OUTPATIENT)
Dept: ENDOSCOPY | Facility: HOSPITAL | Age: 54
End: 2024-12-03
Payer: COMMERCIAL

## 2024-12-03 VITALS
RESPIRATION RATE: 18 BRPM | HEIGHT: 67 IN | TEMPERATURE: 98 F | HEART RATE: 83 BPM | DIASTOLIC BLOOD PRESSURE: 85 MMHG | OXYGEN SATURATION: 94 % | BODY MASS INDEX: 42.53 KG/M2 | WEIGHT: 271 LBS | SYSTOLIC BLOOD PRESSURE: 136 MMHG

## 2024-12-03 DIAGNOSIS — D64.9 ANEMIA, UNSPECIFIED TYPE: ICD-10-CM

## 2024-12-03 PROCEDURE — 0DJ08ZZ INSPECTION OF UPPER INTESTINAL TRACT, VIA NATURAL OR ARTIFICIAL OPENING ENDOSCOPIC: ICD-10-PCS | Performed by: STUDENT IN AN ORGANIZED HEALTH CARE EDUCATION/TRAINING PROGRAM

## 2024-12-03 PROCEDURE — 0DJD8ZZ INSPECTION OF LOWER INTESTINAL TRACT, VIA NATURAL OR ARTIFICIAL OPENING ENDOSCOPIC: ICD-10-PCS | Performed by: STUDENT IN AN ORGANIZED HEALTH CARE EDUCATION/TRAINING PROGRAM

## 2024-12-03 PROCEDURE — 43235 EGD DIAGNOSTIC BRUSH WASH: CPT | Performed by: STUDENT IN AN ORGANIZED HEALTH CARE EDUCATION/TRAINING PROGRAM

## 2024-12-03 PROCEDURE — 45378 DIAGNOSTIC COLONOSCOPY: CPT | Performed by: STUDENT IN AN ORGANIZED HEALTH CARE EDUCATION/TRAINING PROGRAM

## 2024-12-03 RX ORDER — SODIUM CHLORIDE, SODIUM LACTATE, POTASSIUM CHLORIDE, CALCIUM CHLORIDE 600; 310; 30; 20 MG/100ML; MG/100ML; MG/100ML; MG/100ML
INJECTION, SOLUTION INTRAVENOUS CONTINUOUS
Status: DISCONTINUED | OUTPATIENT
Start: 2024-12-03 | End: 2024-12-03

## 2024-12-03 RX ORDER — NALOXONE HYDROCHLORIDE 0.4 MG/ML
0.08 INJECTION, SOLUTION INTRAMUSCULAR; INTRAVENOUS; SUBCUTANEOUS ONCE AS NEEDED
Status: DISCONTINUED | OUTPATIENT
Start: 2024-12-03 | End: 2024-12-03

## 2024-12-03 RX ADMIN — SODIUM CHLORIDE, SODIUM LACTATE, POTASSIUM CHLORIDE, CALCIUM CHLORIDE: 600; 310; 30; 20 INJECTION, SOLUTION INTRAVENOUS at 07:07:00

## 2024-12-03 NOTE — ANESTHESIA PREPROCEDURE EVALUATION
PRE-OP EVALUATION    Patient Name: Mo Gloria    Admit Diagnosis: Anemia, unspecified type [D64.9]    Pre-op Diagnosis: Anemia, unspecified type [D64.9]    ESOPHAGOGASTRODUODENOSCOPY (EGD) AND COLONOSCOPY    Anesthesia Procedure: ESOPHAGOGASTRODUODENOSCOPY (EGD) AND COLONOSCOPY  COLONOSCOPY    Surgeons and Role:     * Elier Khan MD - Primary    Pre-op vitals reviewed.  Temp: 97.6 °F (36.4 °C)  Pulse: 108  Resp: 16  BP: 159/87  SpO2: 99 %  Body mass index is 42.44 kg/m².    Current medications reviewed.  Hospital Medications:  No current facility-administered medications on file as of .       Outpatient Medications:   Prescriptions Prior to Admission[1]    Allergies: Patient has no known allergies.      Anesthesia Evaluation        Anesthetic Complications           GI/Hepatic/Renal    Negative GI/hepatic/renal ROS.                             Cardiovascular        Exercise tolerance: good     MET: >4    (+) obesity                                       Endo/Other               (+) anemia                   Pulmonary    Negative pulmonary ROS.                       Neuro/Psych    Negative neuro/psych ROS.                          Morbid obesity BMI 43.  Fe-deficiency anemia          History reviewed. No pertinent surgical history.  Social History     Socioeconomic History    Marital status: Single   Tobacco Use    Smoking status: Never    Smokeless tobacco: Never   Vaping Use    Vaping status: Never Used   Substance and Sexual Activity    Alcohol use: Not Currently     Alcohol/week: 0.0 - 1.0 standard drinks of alcohol     Comment: ocassionally    Drug use: Never   Other Topics Concern    Caffeine Concern No    Exercise No    Seat Belt No    Special Diet Yes     Comment: No red meat    Stress Concern No    Weight Concern No     History   Drug Use Unknown     Available pre-op labs reviewed.  Lab Results   Component Value Date    WBC 5.6 11/21/2024    RBC 4.74 11/21/2024    HGB 7.0 (L) 11/21/2024    HCT 26.9 (L)  11/21/2024    MCV 56.8 (L) 11/21/2024    MCH 14.8 (L) 11/21/2024    MCHC 26.0 (L) 11/21/2024    RDW 22.7 11/21/2024    .0 (H) 11/21/2024     Lab Results   Component Value Date     11/20/2024    K 4.2 11/20/2024     11/20/2024    CO2 27.0 11/20/2024    BUN 13 11/20/2024    CREATSERUM 1.19 11/20/2024    GLU 78 11/20/2024    CA 9.3 11/20/2024            Airway      Mallampati: IV  Mouth opening: >3 FB  TM distance: 4 - 6 cm  Neck ROM: full Cardiovascular    Cardiovascular exam normal.         Dental    Dentition appears grossly intact         Pulmonary    Pulmonary exam normal.                 Other findings              ASA: 3   Plan: MAC  NPO status verified and patient meets guidelines.          Plan/risks discussed with: patient                Present on Admission:  **None**             [1]   No medications prior to admission.

## 2024-12-03 NOTE — DISCHARGE INSTRUCTIONS
Home Care Instructions for Colonoscopy and/or Gastroscopy with Sedation    Diet:  - Resume your regular diet .  - Start with light meals to minimize bloating.  - Do not drink alcohol today.    Medication:  - If you have questions about resuming your normal medications, please contact your Primary Care Physician.    Activities:  - Take it easy today. Do not return to work today.  - Do not drive today.  - Do not operate any machinery today (including kitchen equipment).    Colonoscopy:  - You may notice some rectal \"spotting\" (a little blood on the toilet tissue) for a day or two after the exam. This is normal.  - If you experience any rectal bleeding (not spotting), persistent tenderness or sharp severe abdominal pains, oral temperature over 100 degrees Fahrenheit, light-headedness or dizziness, or any other problems, contact your doctor.    Gastroscopy:  - You may have a sore throat for 2-3 days following the exam. This is normal. Gargling with warm salt water (1/2 tsp salt to 1 glass warm water) or using throat lozenges will help.  - If you experience any sharp pain in your neck, abdomen or chest, vomiting of blood, oral temperature over 100 degrees Fahrenheit, light-headedness or dizziness, or any other problems, contact your doctor.    **If unable to reach your doctor, please go to the [facility] Emergency Room**    - Your referring physician will receive a full report of your examination.  - If you do not hear from your doctor's office within two weeks of your biopsy, please call them for your results.

## 2024-12-03 NOTE — ANESTHESIA POSTPROCEDURE EVALUATION
[facility] Misael Gloria Patient Status:  Hospital Outpatient Surgery   Age/Gender 54 year old male MRN [id number]   Location [facility] END[facility]COPY [facility] Attending Elier Khan MD   Hosp Day # 0 PCP Matthew Victor MD       Anesthesia Post-op Note    ESOPHAGOGASTRODUODENOSCOPY (EGD) AND COLONOSCOPY    Procedure Summary       Date: 12/03/24 Room / Location:  ENDOSCOPY 03 / [facility] END[facility]    Anesthesia Start: 0707 Anesthesia Stop: 0749    Procedures:       ESOPHAGOGASTRODUODENOSCOPY (EGD) AND COLONOSCOPY      COLONOSCOPY Diagnosis:       Anemia, unspecified type      (EGD: normal  COLON: diverticulosis)    Surgeons: Elier Khan MD Anesthesiologist: Oh Rocha MD    Anesthesia Type: MAC ASA Status: 3            Anesthesia Type: MAC    Vitals Value Taken Time   /90 12/03/24 0748   Temp  12/03/24 0750   Pulse 87 12/03/24 0749   Resp 18 12/03/24 0750   SpO2 99 % 12/03/24 0750   Vitals shown include unfiled device data.    Patient Location: Endoscopy    Anesthesia Type: MAC    Airway Patency: patent    Postop Pain Control: adequate    Mental Status: mildly sedated but able to meaningfully participate in the post-anesthesia evaluation    Nausea/Vomiting: none    Cardiopulmonary/Hydration status: stable euvolemic    Complications: no apparent anesthesia related complications    Postop vital signs: stable    Dental Exam: Unchanged from Preop    Patient to be discharged home when criteria met.

## 2024-12-03 NOTE — OPERATIVE REPORT
[facility]  Operative Note    Mo Gloria Location: OR   CSN [id number] MRN [id number]    1970 Age 54 year old   Admission Date 12/3/2024 Operation Date 12/3/2024   Attending Physician Elier Khan MD Operating Physician Elier Khan MD   PCP Matthew Victor MD          Patient Name: Mo Gloria    Preoperative Diagnosis: Anemia, unspecified type [D64.9]    Postoperative Diagnosis:   Diverticulosis    Primary Surgeon: Elier Khan MD    Anesthesia: MAC    Procedures:   Esophagogastroduodenoscopy (EGD)  Colonoscopy to the cecum    Specimen:   None    Estimated Blood Loss: None    Complications: None immediate    Condition: Good    Indications for Surgery:   This is a very nice 54-year-old gentleman referred to me for consideration of a colonoscopy in the setting of severe iron iron deficiency anemia.  Patient recently established care with Dr. Victor, his primary care physician, and routine blood work was collected on 2024.  This showed severe anemia with hemoglobin 7.0.  Patient endorses feeling fatigued but states he works 13-hour shifts and attributed his fatigue due to this.  He denies any rectal bleeding or melena.  He denies any change in bowel habits or unintentional weight loss.  He denies any abdominal pain.  He has never had an EGD or colonoscopy.  No family history of colon or rectal cancer.  No blood thinners.     I recommend planning for an EGD and colonoscopy as soon as reasonably possible to rule out any large masses or ulcer disease that may explain his severe anemia.  The details of these procedures were discussed including the prep instructions, risks, benefits and alternatives.  Patient expressed understanding and was agreeable to schedule these procedures with me.     Patient presents for these procedures today.  He completed the bowel prep as instructed.  Consent was signed.  All questions answered.    Surgical Findings:   The quality of the bowel prep was  excellent.  Normal duodenum, stomach and esophagus.  Pandiverticulosis most concentrated in the sigmoid colon.  The colon was otherwise healthy without any masses, polyps or inflammatory changes.    Description of Procedure:   The patient was taken to the endoscopy suite and positioned in the left lateral decubitus position with knees flexed. Monitored anesthesia care was administered. A time-out was performed.     A well-lubricated gastroscope was inserted through the mouth and was carefully advanced under direct vision into the esophagus, stomach and into the the fist portion of the duodenum.  The mucosa was circumferentially examined as the gastroscope was withdrawn.     The stomach and duodenum appeared healthy and normal without any masses or ulcers. The gastroscope was retroflexed within the stomach to evaluate the fundus. Insufflation was suctioned out of the stomach as the gastroscope was withdrawn.  The esophagus appeared healthy and normal without any masses or lesions. The bite-block was removed and the patient's bed was turned as I moved on to the colonoscopy portion of the procedure.     The perineum and perianal skin were examined. A digital rectal examination was performed.  These were both normal A well-lubricated adult colonoscope was inserted through the anus and carefully navigated to the cecum. CO2 insufflation was used throughout the procedure. Advancement was accomplished with ease.  The appendiceal orifice and ileocecal valve were identified and photographed. The terminal ileum was not intubated. The scope was then withdrawn as the mucosa was circumferentially examined.      There was pan-diverticulosis with many small and medium sized diverticula most concentrated in the sigmoid colon.  The colon was otherwise healthy without any masses, polyps or inflammatory changes. In the rectum, the scope was retroflexed to evaluate the anorectal junction.  The scope was straightened and excess gas was  suctioned from the colon and the scope removed, terminating the procedure.      Anesthesia was terminated and the patient transported to the recovery unit in good condition. The patient tolerated the procedure well without apparent intraoperative complication.    Follow up:   Patient will need next colonoscopy in 10 years.       Elier Khan MD  12/3/2024  8:08 AM

## 2024-12-30 ENCOUNTER — OFFICE VISIT (OUTPATIENT)
Age: 54
End: 2024-12-30
Attending: INTERNAL MEDICINE
Payer: COMMERCIAL

## 2024-12-30 ENCOUNTER — APPOINTMENT (OUTPATIENT)
Age: 54
End: 2024-12-30
Attending: INTERNAL MEDICINE
Payer: COMMERCIAL

## 2024-12-30 VITALS
TEMPERATURE: 98 F | SYSTOLIC BLOOD PRESSURE: 154 MMHG | WEIGHT: 274 LBS | RESPIRATION RATE: 18 BRPM | OXYGEN SATURATION: 97 % | HEART RATE: 96 BPM | DIASTOLIC BLOOD PRESSURE: 89 MMHG | BODY MASS INDEX: 43 KG/M2

## 2024-12-30 DIAGNOSIS — D50.9 IRON DEFICIENCY ANEMIA, UNSPECIFIED IRON DEFICIENCY ANEMIA TYPE: Primary | ICD-10-CM

## 2024-12-30 NOTE — PROGRESS NOTES
Patient arrives to infusion for Infed . Patient denies any issues or concerns.      Ordering Provider: Harsh  Order Exp: ongoing     Patient appeared to tolerate infusion without difficulty or complaint. No s/s of adverse reaction noted. Reviewed next apt date/time: Labs in 3 months    Education Record  Learner:  Patient and Spouse  Disease / Diagnosis: Anemia  Barriers / Limitations:  None  Method:  Discussion  General Topics:  Medication  Outcome:  Shows understanding

## 2025-01-27 ENCOUNTER — PATIENT MESSAGE (OUTPATIENT)
Dept: FAMILY MEDICINE CLINIC | Facility: CLINIC | Age: 55
End: 2025-01-27

## 2025-02-10 ENCOUNTER — OFFICE VISIT (OUTPATIENT)
Dept: FAMILY MEDICINE CLINIC | Facility: CLINIC | Age: 55
End: 2025-02-10
Payer: COMMERCIAL

## 2025-02-10 VITALS
WEIGHT: 269 LBS | OXYGEN SATURATION: 93 % | BODY MASS INDEX: 42.22 KG/M2 | SYSTOLIC BLOOD PRESSURE: 146 MMHG | DIASTOLIC BLOOD PRESSURE: 98 MMHG | HEART RATE: 86 BPM | RESPIRATION RATE: 18 BRPM | HEIGHT: 67 IN

## 2025-02-10 DIAGNOSIS — D50.9 IRON DEFICIENCY ANEMIA, UNSPECIFIED IRON DEFICIENCY ANEMIA TYPE: ICD-10-CM

## 2025-02-10 DIAGNOSIS — E66.01 MORBID OBESITY WITH BMI OF 40.0-44.9, ADULT (HCC): Primary | ICD-10-CM

## 2025-02-10 DIAGNOSIS — R29.818 SUSPECTED SLEEP APNEA: ICD-10-CM

## 2025-02-10 DIAGNOSIS — R73.03 PREDIABETES: ICD-10-CM

## 2025-02-10 DIAGNOSIS — I10 ESSENTIAL HYPERTENSION: ICD-10-CM

## 2025-02-10 PROCEDURE — 3080F DIAST BP >= 90 MM HG: CPT | Performed by: FAMILY MEDICINE

## 2025-02-10 PROCEDURE — 99214 OFFICE O/P EST MOD 30 MIN: CPT | Performed by: FAMILY MEDICINE

## 2025-02-10 PROCEDURE — 3008F BODY MASS INDEX DOCD: CPT | Performed by: FAMILY MEDICINE

## 2025-02-10 PROCEDURE — 3077F SYST BP >= 140 MM HG: CPT | Performed by: FAMILY MEDICINE

## 2025-02-10 RX ORDER — TIRZEPATIDE 2.5 MG/.5ML
2.5 INJECTION, SOLUTION SUBCUTANEOUS WEEKLY
Qty: 2 ML | Refills: 0 | Status: SHIPPED | OUTPATIENT
Start: 2025-02-10 | End: 2025-03-04

## 2025-02-10 NOTE — PROGRESS NOTES
[facility] Family Medicine Office Note  Chief Complaint:   Chief Complaint   Patient presents with    Weight Loss     Discuss medication options        The following individual(s) verbally consented to be recorded using ambient AI listening technology and understand that they can each withdraw their consent to this listening technology at any point by asking the clinician to turn off or pause        HPI:   This is a 54 year old male coming in for follow up regarding weight, blood pressure, iron deficiency.     Iron deficiency anemia - EGD/colonoscopy normal. Has seen hematology, thinks might be d/t diet as he is plant based. Has received iron infusion x2. Feels better, more energy less fatigue. Has been trying to incorporate more iron into diet.     Obesity - interested in wt loss medication. Has only lost 3 lbs since last OV with dietary changes and exercise. He has not been on wt loss medications in the past.    History reviewed. No pertinent past medical history.  Past Surgical History:   Procedure Laterality Date    Colonoscopy N/A 12/3/2024    Procedure: COLONOSCOPY;  Surgeon: Elier Khan MD;  Location:  ENDOSCOPY     Social History:  Social History     Socioeconomic History    Marital status: Single   Tobacco Use    Smoking status: Never    Smokeless tobacco: Never   Vaping Use    Vaping status: Never Used   Substance and Sexual Activity    Alcohol use: Not Currently     Alcohol/week: 0.0 - 1.0 standard drinks of alcohol     Comment: ocassionally    Drug use: Never   Other Topics Concern    Caffeine Concern No    Exercise No    Seat Belt No    Special Diet Yes     Comment: No red meat    Stress Concern No    Weight Concern No     Family History:  Family History   Problem Relation Age of Onset    Pulmonary Disease Father     Cancer Father     Hypertension Mother     Heart Disorder Mother     Cancer Sister     Breast Cancer Sister         Breast cancer     Allergies:  Allergies[1]  Current  Meds:  Current Outpatient Medications   Medication Sig Dispense Refill    Tirzepatide-Weight Management (ZEPBOUND) 2.5 MG/0.5ML Subcutaneous Solution Auto-injector Inject 2.5 mg into the skin once a week for 4 doses. 2 mL 0    Meloxicam 7.5 MG Oral Tab Take 1 tablet (7.5 mg total) by mouth daily. 30 tablet 2    PEG 3350-KCl-Na Bicarb-NaCl (TRILYTE) 420 g Oral Recon Soln Starting at 4:00 pm the night before procedure, drink 8 ounces of the prep every 15-20 minutes until finished (Patient not taking: Reported on 2/10/2025) 1 each 0      Counseling given: Not Answered       REVIEW OF SYSTEMS:   Review of Systems   Constitutional: Negative.    HENT: Negative.     Eyes: Negative.    Respiratory: Negative.     Cardiovascular: Negative.    Gastrointestinal: Negative.    Endocrine: Negative.    Genitourinary: Negative.    Musculoskeletal: Negative.    Skin: Negative.    Neurological: Negative.    Psychiatric/Behavioral: Negative.          EXAM:   BP (!) 146/98   Pulse 86   Resp 18   Ht 5' 7\" (1.702 m)   Wt 269 lb (122 kg)   SpO2 93%   BMI 42.13 kg/m²  Estimated body mass index is 42.13 kg/m² as calculated from the following:    Height as of this encounter: 5' 7\" (1.702 m).    Weight as of this encounter: 269 lb (122 kg).   Vital signs reviewed.Appears stated age, well groomed.  Physical Exam  Vitals and nursing note reviewed.   Constitutional:       Appearance: Normal appearance. He is obese.   HENT:      Head: Normocephalic and atraumatic.   Eyes:      Pupils: Pupils are equal, round, and reactive to light.   Cardiovascular:      Rate and Rhythm: Normal rate and regular rhythm.      Pulses: Normal pulses.      Heart sounds: Normal heart sounds. No murmur heard.  Pulmonary:      Effort: Pulmonary effort is normal. No respiratory distress.      Breath sounds: Normal breath sounds. No stridor. No wheezing or rhonchi.   Musculoskeletal:      Right lower leg: No edema.      Left lower leg: No edema.   Skin:     Findings:  No rash.   Neurological:      Mental Status: He is alert and oriented to person, place, and time.   Psychiatric:         Mood and Affect: Mood normal.        ASSESSMENT AND PLAN:   1. Morbid obesity with BMI of 40.0-44.9, adult (HCC)  Start medically supervised weight loss program. Start zepbound  Reviewed dietary/exercise recommendations. Advised to start reduced calorie diet. Reviewed BMR, follow 500cal daily deficit. Advised on 150 minutes of moderate intensity exercise weekly. Incorporate both CV and resistance exercises into your program.   Reviewed medication administration, side effects.   F/u 3mo for wt check.     This is a prior authorization request for Zepbound.  This medication is being used for obesity.  This medication will not be concomitantly used with other weight loss medications.  This medication will not be used concomitantly with other GLP-1 agonist.    This is an INITIAL  therapy request.    At baseline the patient had BMI greater than 30 kg/m² .   Mo Gloria has tried and failed at least 3 months of behavioral modification and dietary restrictions.    This medication will concomitantly be used with continued behavior modification reduced calorie diet.      - Tirzepatide-Weight Management (ZEPBOUND) 2.5 MG/0.5ML Subcutaneous Solution Auto-injector; Inject 2.5 mg into the skin once a week for 4 doses.  Dispense: 2 mL; Refill: 0    2. Prediabetes  Counseled on low carb diet, regular exercise.   - Hemoglobin A1C [E]; Future    3. Essential hypertension  BP elevated. Wants to pursue lifestyle changes before starting medication. Reviewed risks of uncontrolled HTN. Advised on low sodium diet, regular exercise, wt loss. Monitor BP at home goal <140/90. F/u 3mo. Consider medication if no improvement.   - Comp Metabolic Panel (14); Future    4. Iron deficiency anemia, unspecified iron deficiency anemia type  Improving, due for repeat labs in 03/2025.     5. Suspected sleep apnea  High suspicion for  AHSAN - screened positive on recent questionnaire during EGD/colonoscopy. Will order sleep study.   - Diagnostic Sleep Study-split night PAP implemented if criteria met  - General sleep study; Future      Meds & Refills for this Visit:  Requested Prescriptions     Signed Prescriptions Disp Refills    Tirzepatide-Weight Management (ZEPBOUND) 2.5 MG/0.5ML Subcutaneous Solution Auto-injector 2 mL 0     Sig: Inject 2.5 mg into the skin once a week for 4 doses.       Health Maintenance:  Health Maintenance Due   Topic Date Due    Annual Physical  Never done    DTaP,Tdap,and Td Vaccines (1 - Tdap) Never done    Pneumococcal Vaccine: 50+ Years (1 of 1 - PCV) Never done    Zoster Vaccines (1 of 2) Never done    COVID-19 Vaccine (1 - 2024-25 season) Never done    HTN: BP Follow-Up  12/25/2024    Annual Depression Screening  01/01/2025       Patient/Caregiver Education: Patient/Caregiver Education: There are no barriers to learning. Medical education done.   Outcome: Patient verbalizes understanding. Patient is notified to call with any questions, complications, allergies, or worsening or changing symptoms.  Patient is to call with any side effects or complications from the treatments as a result of today.     Problem List:  Patient Active Problem List   Diagnosis    Iron deficiency anemia    Essential hypertension    Prediabetes    Morbid obesity with BMI of 40.0-44.9, adult (HCC)          [1] No Known Allergies

## 2025-02-13 ENCOUNTER — TELEPHONE (OUTPATIENT)
Dept: FAMILY MEDICINE CLINIC | Facility: CLINIC | Age: 55
End: 2025-02-13

## 2025-02-14 NOTE — TELEPHONE ENCOUNTER
Patient BCBS PPO ID#[id number]  have already been verified in [employer] and it is uploaded in his chart.

## 2025-02-14 NOTE — TELEPHONE ENCOUNTER
Zepbound Denied- covered only if have been in a comprehensive weight management program for at least 6 months before starting drug.

## 2025-02-14 NOTE — TELEPHONE ENCOUNTER
Denied today by [facility] 2017  Your PA request has been denied. Additional information will be provided in the denial communication. (Message [phone])

## 2025-02-14 NOTE — TELEPHONE ENCOUNTER
Please notify patient that zepbound has been denied; needs 6 mo comprehensive weight loss program before approval.

## 2025-02-17 ENCOUNTER — PATIENT MESSAGE (OUTPATIENT)
Dept: FAMILY MEDICINE CLINIC | Facility: CLINIC | Age: 55
End: 2025-02-17

## 2025-02-17 DIAGNOSIS — E66.01 MORBID OBESITY WITH BMI OF 40.0-44.9, ADULT (HCC): Primary | ICD-10-CM

## 2025-02-18 NOTE — TELEPHONE ENCOUNTER
Valente, Matthew Chen MD to [facility] Clinical Staff       2/14/25 11:54 AM  Note     Please notify patient that zepbound has been denied; needs 6 mo comprehensive weight loss program before approval.        Dr. Victor - Pt wondering if there are other options instead of Zepbound

## 2025-02-19 NOTE — TELEPHONE ENCOUNTER
Other options include phentermine, contrave, wellbutrin etc. Would recommend against phentermine and contrave at this time until his blood pressure improves.     Can trial wellbutrin if interested. Antidepressant medication that can have some possible benefit - decreased appetite. Contraindications include h/o seizure disorder which he doesn't have so this would be a safe medication for him to start.

## 2025-02-20 RX ORDER — BUPROPION HYDROCHLORIDE 150 MG/1
150 TABLET ORAL DAILY
Qty: 30 TABLET | Refills: 2 | Status: SHIPPED | OUTPATIENT
Start: 2025-02-20

## 2025-03-25 NOTE — TELEPHONE ENCOUNTER
----- Message from Marialuisa JORDAN sent at 3/25/2025 12:52 PM CDT -----  Please schedule for MD, Iron infusion

## 2025-03-26 NOTE — TELEPHONE ENCOUNTER
Patient is returning a call to schedule an infusion. Dr. House. Please call cell at [phone] before 900 am, after 900 am call work number [phone].

## 2025-04-01 NOTE — PROGRESS NOTES
Pt here for Infed . Pt denies any issues or concerns.      Ordering Provider: Dr. House  Order Exp: one dose     Pt tolerated infusion without difficulty or complaint. Reviewed next apt date/time: yes      Education Record  Learner:  Patient  Disease / Diagnosis: iron deficiency anemia  Barriers / Limitations:  None  Method:  Brief focused and Discussion  General Topics:  Medication and Plan of care reviewed  Outcome:  Shows understanding     Dr. House aware of elevated BP - ok to proceed w/ Infed.

## 2025-04-01 NOTE — TELEPHONE ENCOUNTER
Action Requested: Summary for Provider     []  Critical Lab, Recommendations Needed  [] Need Additional Advice  []   FYI    []   Need Orders  [] Need Medications Sent to Pharmacy  []  Other     SUMMARY: Patient called to report an elevated BP at office visit today for iron infusion. Was 181/120, then 169/100. Patient states he is always nervous and jittery when he goes to appointments. Patient denies headache, chest pain, SOB, vision changes or any symptoms. States he feels fine. He is not on BP medication at this time. He has a wrist BP cuff at home. He is going to buy another better quality cuff now. Advised patient how to take BP. Advised patient to take BP at home, bring readings in to appointment on Friday. Call with any further concerns. Patient confirms understanding.     Reason for call: Blood Pressure  Onset: Today     Reason for Disposition   Systolic BP >= 160 OR Diastolic >= 100    Protocols used: Blood Pressure - High-A-OH

## 2025-04-01 NOTE — PROGRESS NOTES
Patient is here for 4 month MD follow up for Anemia and Infed infusion. Labs drawn on 3/25.      Education Record    Learner:  Patient    Disease / Diagnosis:  Anemia    Barriers / Limitations:  None   Comments:    Method:  Discussion   Comments:    General Topics:  Plan of care reviewed   Comments:    Outcome:  Shows understanding   Comments:

## 2025-04-01 NOTE — PROGRESS NOTES
Hematology/Oncology Clinic Follow Up Visit    Patient Name: Mo Gloria  Medical Record Number: [id number]    YOB: 1970   PCP: Matthew Victor MD    Reason for Consultation:  Mo Gloria was seen today for the diagnosis of iron deficiency anemia    History of Present Illness:      53 y/o M PMH iron deficiency anemia who presents for follow up.    - hgb significantly improved to 14.1 g/dL, still iron deficient  - he notes significantly improved energy levels, still tired from working long shifts at [employer]  - has completed colonoscopy with Dr Khan      Past Medical History:  No past medical history on file.  Past Surgical History:   Procedure Laterality Date    Colonoscopy N/A 12/3/2024    Procedure: COLONOSCOPY;  Surgeon: Elier Khan MD;  Location:  ENDOSCOPY       Home Medications:  No outpatient medications have been marked as taking for the 4/1/25 encounter (Office Visit) with Logan House MD.       Allergies:   Allergies[1]    Psychosocial History:  Social History     Socioeconomic History    Marital status: Single     Spouse name: Not on file    Number of children: Not on file    Years of education: Not on file    Highest education level: Not on file   Occupational History    Not on file   Tobacco Use    Smoking status: Never    Smokeless tobacco: Never   Vaping Use    Vaping status: Never Used   Substance and Sexual Activity    Alcohol use: Not Currently     Alcohol/week: 0.0 - 1.0 standard drinks of alcohol     Comment: ocassionally    Drug use: Never    Sexual activity: Not on file   Other Topics Concern    Caffeine Concern No    Exercise No    Seat Belt No    Special Diet Yes     Comment: No red meat    Stress Concern No    Weight Concern No   Social History Narrative    Not on file     Social Drivers of Health     Food Insecurity: Not on file   Transportation Needs: Not on file   Housing Stability: Not on file       Family Medical History:  Family History    Problem Relation Age of Onset    Pulmonary Disease Father     Cancer Father     Hypertension Mother     Heart Disorder Mother     Cancer Sister     Breast Cancer Sister         Breast cancer       Review of Systems:  A 10-point ROS was done with pertinent positives and negative per the HPI    Vital Signs:  Height: 170.2 cm (5' 7.01\") (04/01 1049)  Weight: 118.4 kg (261 lb) (04/01 1049)  BSA (Calculated - sq m): 2.26 sq meters (04/01 1049)  Pulse: 98 (04/01 1049)  BP: 181/120 (04/01 1056)  Temp: 97.1 °F (36.2 °C) (04/01 1049)  Do Not Use - Resp Rate: --  SpO2: 98 % (04/01 1049)    Wt Readings from Last 6 Encounters:   04/01/25 118.4 kg (261 lb)   02/10/25 122 kg (269 lb)   12/30/24 124.3 kg (274 lb)   12/03/24 122.9 kg (271 lb)   12/02/24 123.4 kg (272 lb)   11/25/24 122.9 kg (271 lb)       Physical Examination:  General: Patient is alert and oriented, not in acute distress  Psych: Mood and affect are appropriate  Eyes: EOMI, PERRL  ENT: Oropharynx is clear, no adenopathy  CV:  no LE edema  Respiratory: Non-labored respirations  GI/Abd: Soft, non-tender    Laboratory:  Lab Results   Component Value Date    WBC 5.8 03/25/2025    WBC 5.6 11/21/2024    WBC 5.1 11/20/2024    HGB 14.1 03/25/2025    HGB 7.0 (L) 11/21/2024    HGB 6.7 (LL) 11/20/2024    HCT 45.7 03/25/2025    MCV 76.7 (L) 03/25/2025    MCH 23.7 (L) 03/25/2025    MCHC 30.9 (L) 03/25/2025    RDW 18.9 03/25/2025    .0 03/25/2025    .0 (H) 11/21/2024    .0 (H) 11/20/2024     Lab Results   Component Value Date    GLU 78 11/20/2024    BUN 13 11/20/2024    CREATSERUM 1.19 11/20/2024    ANIONGAP 6 11/20/2024    CA 9.3 11/20/2024    OSMOCALC 295 11/20/2024    ALKPHO 75 11/20/2024    AST 21 11/20/2024    ALT 14 11/20/2024    BILT 0.5 11/20/2024    TP 7.7 11/20/2024    ALB 4.3 11/20/2024    GLOBULIN 3.4 11/20/2024     11/20/2024    K 4.2 11/20/2024     11/20/2024    CO2 27.0 11/20/2024     No results found for: \"PTT\", \"PT\",  \"INR\"    Impression & Plan:     Iron deficiency anemia  - secondary to inadequate iron intake (pescatarian diet)  - 1000mg IV iron dextran today  - repeat labs in 6 months    HTN  - not controlled. Asymptomatic  - discussed to see PCP to start antihypertensive. I discussed diet change unlikely to help lower BP to goal. He will f/u with PCP    F/u for IV iron in future when needed    Logan Harsh  [facility] Hematology Oncology             [1] No Known Allergies

## 2025-04-04 NOTE — PATIENT INSTRUCTIONS
Controlling High Blood Pressure   High blood pressure (hypertension) is often called the silent killer. This is because many people who have it, don’t know it. It can be very dangerous. High blood pressure can raise your risk of heart attack, stroke, heart disease, and heart failure. Controlling your blood pressure can lower your risk of these problems. It's important to check yourblood pressure regularly. It can save your life.   Blood pressure measurements are given as 2 numbers. Systolic blood pressure is the upper number. This is the pressure when the heart contracts. Diastolic blood pressure is the lower number. This is the pressure when the heartrelaxes between beats.   Blood pressure is grouped like this:   Normal blood pressure. This is systolic of less than 120 and diastolic of less than 80 (120/80).  Elevated blood pressure.  This is systolic of 120 to 129 and diastolic less than 80.  Stage 1 high blood pressure.  This is systolic of 130 to 139 or diastolic between 80 to 89.  Stage 2 high blood pressure.  This is systolic of 140 or higher or diastolic of 90 or higher.  A heart-healthy lifestyle can help you control your blood pressure withoutmedicines. Below are some things you can do to have a heart-healthy lifestyle.     Eat heart-healthy foods   Choose low-salt, low-fat foods. Limit your sodium to 2,300 mg per day or the amount advised by your healthcare provider.  Limit canned, dried, cured, packaged, and fast foods. These can contain a lot of salt.  Eat 8 to 10 servings of fruits and vegetables every day.  Choose lean meats, fish, or chicken.  Eat whole-grain pasta, brown rice, and beans.  Eat 2 to 3 servings of low-fat or fat-free dairy products.  Ask your doctor about the DASH eating plan. This plan helps reduce blood pressure.  When you go to a restaurant, ask that your meal be made with no added salt.    Stay at a healthy weight   Ask your healthcare provider how many calories to eat a day. Then  stick to that number.  Ask your provider what weight range is healthiest for you. If you are overweight, a weight loss of only 3% to 5% of your body weight can help lower blood pressure. A good weight loss goal is to lose 10% of your body weight in a year.  Limit snacks and sweets.  Get regular exercise.    Get more active   Find activities you enjoy. They can be done alone or with friends or family. Try bicycling, dancing, walking, or jogging.  Park farther away from building entrances to walk more.  Use stairs instead of the elevator.  When you can, walk or bike instead of driving.  [place] leaves, garden, or do household repairs.  Be active at a moderate to vigorous level of physical activity for at least 30 minutes a day for at least 5 days a week.     Manage stress   Make time to relax and enjoy life. Find time to laugh.  Talk about your concerns with your loved ones and your healthcare provider.  Visit with family and friends, and keep up with hobbies.    Limit alcohol and quit smoking   Men should have no more than 2 drinks per day.  Women should have no more than 1 drink per day.  If you smoke, make a plan to stop. Talk with your healthcare provider for help. Smoking greatly raises your risk for heart disease and stroke. Ask your provider about stop-smoking programs and other support.    Blood pressure medicines  If your lifestyle changes aren’t enough, your healthcare provider may prescribe high blood pressure medicine. Take all medicines as prescribed. If you have any questions about yourmedicines, ask your provider before stopping or changing them.   Zango last reviewed this educational content on12/1/2021 © 2000-2022 [employer]. All rights reserved. This information is not intended as a substitute for professional medical care. Always follow yourC[facility] Med[facility] Hospitalcare professional's instruction    Video HealthSheets™  What is High Blood Pressure?  Understand what blood pressure is, the health risks  of having high blood pressure, the factors that put you at risk for having high blood pressure, and the importance of working with your healthcare provider to control it.  To watch the video:  Scan the QR code  Using your mobile device, scan the following code:  OR  Go to the website:  PandaDoc  Enter the prescription code:  3414E    © 2000-2022 [employer]. All rights reserved. This information is not intended as a substitute for professional medical care. Always follow your healthcare professional's instructions.    Video [employer]  Eating Well with High Blood Pressure  Certain foods can make your blood pressure go too high. Watch and learn how easy it is to have delicious meals without harming your health.     To watch the video:  Scan the QR code  Using your mobile device, scan the following code:  OR  Go to the website:  [email or web address]  Enter the prescription code:   QIX       © 2000-2022 [employer]. All rights reserved. This information is not intended as a substitute for professional medical care. Always follow your healthcare professional's instructions.    Taking Your Blood Pressure  Blood pressure is the force of blood against the artery wall as it moves from the heart through the blood vessels. You can take your own blood pressure reading using a digital monitor. Take your readings the same each time, usingthe same arm. Take readings as often as your healthcare provider advises.   About blood pressure monitors  Blood pressure monitors are designed for certain ages and cases. You can find monitors for older adults, for pregnant women, and for children. Make sure theone you choose is the right one for your age and situation.   Experts advise an automatic cuff monitor that fits on your upper arm (bicep). The cuff should fit your arm size. A cuff that’s too large or too small won't give an accurate reading. Measure around yourupper arm to find your  size.   Monitors that attach to your finger or wrist are not as accurate as monitorsfor your upper arm.   Ask your healthcare provider for help in choosing a monitor. Bring your monitorto your next provider visit if you need help in using it the correct way.   The steps below are general instructions for using an automatic digitalmonitor.   Step 1. Relax    Take your blood pressure at the same time every day, such as in the morning or evening. Or take it at the time your healthcare provider advises.  Wait at least 30 minutes after smoking, eating, or exercising. Don't drink coffee, tea, soda, or other caffeinated drinks before checking your blood pressure. Use the restroom beforehand.  Sit comfortably at a table with both feet on the floor. Don't cross your legs or feet. Place the monitor near you.  Rest for at least 5 minutes before you begin. Make sure there are no distractions. This includes TV, cell phones, and other electronics. Wait to have conversations with others until after you measure you blood pressure.  Step 2. Wrap the cuff    Place your arm on the table, palm up. Your arm should be at the level of your heart. Wrap the cuff around your upper arm, just above your elbow. It’s best done on bare skin, not over clothing. Most cuffs will show you where the blood vessel in the middle of the arm at the inner side of the elbow (the brachial artery) should line up with the cuff. Look in your monitor's instruction booklet for an illustration. You can also bring your cuff to your healthcare provider and have them show you how to correctly place the cuff.  Step 3. Inflate the cuff    Push the button that starts the pump.  The cuff will tighten, then loosen.  The numbers will change. When they stop changing, your blood pressure reading will appear.  Take 2 or 3 readings 1 minute apart, or as advised by your provider.  Step 4. Write down the results of each reading    Write down your blood pressure numbers for each  reading. Note the date and time. Keep your results in 1 place, such as a notebook. Even if your monitor has a built-in memory, keep a hard copy of the readings.  Remove the cuff from your arm. Turn off the machine.  Bring your blood pressure records with you to each provider visit.  If you start a new blood pressure medicine, note the day you started the new medicine. Also note the day if you change the dose of your medicine. Measure your blood pressure before your take your medicine. This information goes on your blood pressure recording sheet. This will help your provider check how well the medicine changes are working.  Ask your provider what numbers mean that you should call them. Also ask what numbers mean that you should get help right away.  Jamal last reviewed this educational content on12/1/2021 © 2000-2022 [employer]. All rights reserved. This information is not intended as a substitute for professional medical care. Always follow yourhealthcare professional's instructions.

## 2025-04-04 NOTE — PROGRESS NOTES
The following individual(s) verbally consented to be recorded using ambient AI listening technology and understand that they can each withdraw their consent to this listening technology at any point by asking the clinician to turn off or pause the recording:    Patient name: Mo Gloria  Additional names:

## 2025-04-04 NOTE — PROGRESS NOTES
Subjective:   Mo Gloria is a 54 year old male who presents for Hypertension (See TE )     History/Other:   History of Present Illness  The patient presents for HTN follow up. He has been making lifestyle changes and has lost 10 pounds since his last visit. He is also on wellbutrin, which he reports is helping with his condition. He is tolerating medication well w/o side effects.     He has been following a low salt diet and has been receiving iron infusions for his iron deficiency. The patient is also waiting for a blood pressure monitor he ordered online to arrive so he can start monitoring his blood pressure at home.    Denies any symptoms. No HA, vision changes, chest pains, dyspnea, LE swelling.      Chief Complaint Reviewed and Verified  Nursing Notes Reviewed and   Verified  Tobacco Reviewed  Allergies Reviewed  Medications Reviewed    Medical History Reviewed  Surgical History Reviewed  Family History   Reviewed  Social History Reviewed         Tobacco:  He has never smoked tobacco.    Current Outpatient Medications   Medication Sig Dispense Refill    losartan-hydroCHLOROthiazide 50-12.5 MG Oral Tab Take 1 tablet by mouth daily. 30 tablet 2    MELOXICAM 7.5 MG Oral Tab TAKE 1 TABLET(7.5 MG) BY MOUTH DAILY 30 tablet 2    buPROPion  MG Oral Tablet 24 Hr Take 1 tablet (150 mg total) by mouth daily. 30 tablet 2    PEG 3350-KCl-Na Bicarb-NaCl (TRILYTE) 420 g Oral Recon Soln Starting at 4:00 pm the night before procedure, drink 8 ounces of the prep every 15-20 minutes until finished (Patient not taking: Reported on 2/10/2025) 1 each 0     Review of Systems:  Pertinent items are noted in HPI.      Objective:   BP (!) 158/104   Pulse 102   Resp 18   Ht 5' 7\" (1.702 m)   Wt 259 lb (117.5 kg)   SpO2 96%   BMI 40.57 kg/m²  Estimated body mass index is 40.57 kg/m² as calculated from the following:    Height as of this encounter: 5' 7\" (1.702 m).    Weight as of this encounter: 259 lb (117.5  kg).    Physical Exam  Vitals and nursing note reviewed.   Constitutional:       Appearance: Normal appearance. He is obese.   HENT:      Head: Normocephalic and atraumatic.   Cardiovascular:      Rate and Rhythm: Normal rate and regular rhythm.      Pulses: Normal pulses.      Heart sounds: Normal heart sounds. No murmur heard.  Pulmonary:      Effort: Pulmonary effort is normal. No respiratory distress.      Breath sounds: Normal breath sounds. No stridor. No wheezing or rhonchi.   Musculoskeletal:      Right lower leg: No edema.      Left lower leg: No edema.   Neurological:      Mental Status: He is alert and oriented to person, place, and time.   Psychiatric:         Mood and Affect: Mood normal.       Assessment & Plan:   1. Essential hypertension (Primary)  -     Losartan Potassium-HCTZ; Take 1 tablet by mouth daily.  Dispense: 30 tablet; Refill: 2  2. Morbid obesity with BMI of 40.0-44.9, adult ([facility])  3. Suspected sleep apnea  4. Iron deficiency anemia, unspecified iron deficiency anemia type    Assessment & Plan  Hypertension  Hypertension requires medication despite lifestyle changes. Start losartan-hydrochlorothiazide. Reviewed medication administration/side effects. Goal: blood pressure <140/90 mmHg.  - Prescribe losartan hydrochlorothiazide.  - Monitor blood pressure at home, report in two weeks.  - Schedule nurse visit in one month for blood pressure check.  - Schedule follow-up in three months.  -Continue low sodium diet, regular exercise.     Obesity  Satisfaction with bupropion. Appetite suppression noted, aiding weight loss. Continue diet/exercise recommendations.   - Continue bupropion as prescribed.  - Monitor mood and appetite changes.    Suspected sleep apnea   Sleep study considered post-May for scheduling flexibility.  - Consider scheduling a sleep study after May.    Iron deficiency anemia  -Doing well with iron infusion w/ hematology. Feels well. CPM.             Return in about 3 months  (around 7/4/2025).        Matthew Victor MD, 4/4/2025, 11:54 AM

## 2025-04-22 NOTE — TELEPHONE ENCOUNTER
Received fax from [facility][id number] for:    BUPROPION XL 150MG TABLETS (24H)  TAKE 1 TABLET (150 MG) BY MOUTH DAILY  LF 4/21/25  QTY 30      MELOXICAM 7.5MG TABLETS   TAKE 1 TABLET BY MOUTH DAILY   LF 4/1/25  QTY 30       Message:  THE PATIENTS INSURANCE APM REQUIRES A NEW PRESCRIPTION. PLEASE FAX US BACK FOR A NEW PRESCRIPTION APPROVAL. FOR 90 DAYS PER INSURANCE.

## 2025-04-22 NOTE — TELEPHONE ENCOUNTER
Please review.   Dr. Victor, insurance is requiring 90-day supplies to cover $ medications.    How many refills are appropriate?     Requested Prescriptions   Pending Prescriptions Disp Refills    buPROPion  MG Oral Tablet 24 Hr 90 tablet      Sig: Take 1 tablet (150 mg total) by mouth daily.       Psychiatric Non-Scheduled (Anti-Anxiety) Passed - 4/22/2025  9:44 AM        Passed - In person appointment or virtual visit in the past 6 mos or appointment in next 3 mos        Passed - Depression Screening completed within the past 12 months        Passed - Medication is active on med list          Meloxicam 7.5 MG Oral Tab 90 tablet      Sig: Take 1 tablet (7.5 mg total) by mouth daily.       Non-Narcotic Pain Medication Protocol Passed - 4/22/2025  9:44 AM        Passed - In person appointment or virtual visit in the past 6 mos or appointment in next 3 mos        Passed - Medication is active on med list          losartan-hydroCHLOROthiazide 50-12.5 MG Oral Tab 90 tablet      Sig: Take 1 tablet by mouth daily.       Hypertension Medications Protocol Failed - 4/22/2025  9:44 AM        Failed - Last BP reading less than 140/90     BP Readings from Last 1 Encounters:   04/04/25 (!) 158/104           Passed - CMP or BMP in past 12 months        Passed - In person appointment or virtual visit in the past 12 mos or appointment in next 3 mos        Passed - EGFRCR or GFRAA > 50        Passed - Medication is active on med list

## 2025-04-25 NOTE — TELEPHONE ENCOUNTER
Duplicate Refill request/refill too soon.     Bupropion  m month supply sent on 25 to [facility].

## 2025-04-25 NOTE — TELEPHONE ENCOUNTER
[facility] message sent to patient, await reply.   Last ref 4-22-25 #90 + 1 [facility].     Refill Passed Per Protocol    Requested Prescriptions   Pending Prescriptions Disp Refills    BUPROPION  MG Oral Tablet 24 Hr [Pharmacy Med Name: BUPROPION XL 150MG TABLETS (24 H)] 30 tablet 0     Sig: TAKE 1 TABLET(150 MG) BY MOUTH DAILY       Psychiatric Non-Scheduled (Anti-Anxiety) Passed - 4/24/2025 11:31 PM        Passed - In person appointment or virtual visit in the past 6 mos or appointment in next 3 mos     Recent Outpatient Visits              2 weeks ago Essential hypertension    [facility], [place] Matthew Victor MD    Office Visit    3 weeks ago Iron deficiency anemia, unspecified iron deficiency anemia type    [facility]    Office Visit    3 weeks ago Iron deficiency anemia, unspecified iron deficiency anemia type    [facility] [place] [facility] MD    Office Visit    1 month ago Iron deficiency anemia, unspecified iron deficiency anemia type    [facility]    Nurse Only    2 months ago Morbid obesity with BMI of 40.0-44.9, adult ([facility])    [facility], Matthew Miranda MD    Office Visit          Future Appointments         Provider Department Appt Notes    In 5 months PF OT [facility] (Dr. Harsh duff MD)                    Passed - Depression Screening completed within the past 12 months        Passed - Medication is active on med list             Future Appointments         Provider Department Appt Notes    In 5 months PF OT [facility] (Dr. Harsh duff MD)          Recent Outpatient Visits              2 weeks ago Essential hypertension    [facility], Matthew Miranda MD    Office Visit    3 weeks ago Iron deficiency anemia, unspecified iron deficiency anemia type    [facility] [place]    Office Visit    3 weeks ago Iron deficiency anemia, unspecified iron deficiency anemia type    [facility] [place] Logan House MD    Office Visit    1 month ago Iron deficiency anemia, unspecified iron deficiency anemia type    [facility]    Nurse Only    2 months ago Morbid obesity with BMI of 40.0-44.9, adult ([facility])    [facility], Matthew Miranda MD    Office Visit

## (undated) DEVICE — FILTERLINE NASAL ADULT O2/CO2

## (undated) DEVICE — VALVE AIR/H20 DEFENDO SCT BX

## (undated) DEVICE — 3M™ RED DOT™ MONITORING ELECTRODE WITH FOAM TAPE AND STICKY GEL 2570-3, 3/BAG, 200/CASE, 54/PLT: Brand: RED DOT™

## (undated) DEVICE — V2 SPECIMEN COLLECTION MANIFOLD KIT: Brand: NEPTUNE

## (undated) DEVICE — KIT CUSTOM ENDOPROCEDURE STERIS

## (undated) DEVICE — 1200CC GUARDIAN II: Brand: GUARDIAN

## (undated) NOTE — LETTER
24    Patient: Mo Gloria  : 1970 Visit date: 2024    Dear  Matthew Victor MD    Thank you for referring Mo Gloria to my practice.  Please find my assessment and plan below.     Assessment   1. Anemia, unspecified type    2. Iron deficiency anemia, unspecified iron deficiency anemia type        This is a very nice 54-year-old gentleman referred to me for consideration of a colonoscopy in the setting of severe iron iron deficiency anemia.  Patient recently established care with Dr. Victor, his primary care physician, and routine blood work was collected on 2024.  This showed severe anemia with hemoglobin 7.0.  Patient endorses feeling fatigued but states he works 13-hour shifts and attributed his fatigue due to this.  He denies any rectal bleeding or melena.  He denies any change in bowel habits or unintentional weight loss.  He denies any abdominal pain.  He has never had an EGD or colonoscopy.  No family history of colon or rectal cancer.  No blood thinners.    Plan  I recommend planning for an EGD and colonoscopy as soon as reasonably possible to rule out any large masses or ulcer disease that may explain his severe anemia.  The details of these procedures were discussed including the prep instructions, risks, benefits and alternatives.  Patient expressed understanding and was agreeable to schedule these procedures with me.  These have been scheduled for 12/3/2024 at [facility] under monitored anesthesia care.      Sincerely,       Elier Khan MD   CC: No Recipients

## (undated) NOTE — LETTER
OSR/AHSAN Notification    Patient Name: Mo Gloria  -Age / Sex: 1970-A: 54 y  male  Medical Records: [id number] [facility]: [id number]    Surgeon(s):  Surgeon(s):  Elier Khan MD   Procedure: ESOPHAGOGASTRODUODENOSCOPY (EGD) AND COLONOSCOPY    Anesthesia Type: MAC Surgery Date: 12/3/24    During the pre-operative screening process using the STOP-Bang questionnaire, the patient listed above was identified as being at high risk for obstructive sleep apnea.    If you feel it is appropriate to schedule a sleep study, the [facility] will give priority to patients scheduled for procedures to minimize surgical delays.     If a sleep study is completed prior to surgery, please fax the results/plan of care to Pre-Admission Testing ([phone]) as this information will be utilized in clinical decision-making regarding the patient's upcoming surgery.    Thank you for your assistance in helping us provide a safe, seamless and personal experience for your patient.    Fortunato Brunson MD  , Department of Anesthesia